# Patient Record
Sex: FEMALE | Race: ASIAN | NOT HISPANIC OR LATINO | URBAN - METROPOLITAN AREA
[De-identification: names, ages, dates, MRNs, and addresses within clinical notes are randomized per-mention and may not be internally consistent; named-entity substitution may affect disease eponyms.]

---

## 2022-09-22 ENCOUNTER — EMERGENCY (EMERGENCY)
Facility: HOSPITAL | Age: 22
LOS: 0 days | Discharge: HOME | End: 2022-09-23
Attending: EMERGENCY MEDICINE | Admitting: EMERGENCY MEDICINE

## 2022-09-22 VITALS
SYSTOLIC BLOOD PRESSURE: 107 MMHG | DIASTOLIC BLOOD PRESSURE: 75 MMHG | TEMPERATURE: 99 F | HEART RATE: 92 BPM | OXYGEN SATURATION: 100 %

## 2022-09-22 DIAGNOSIS — M25.522 PAIN IN LEFT ELBOW: ICD-10-CM
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
## 2022-09-22 DIAGNOSIS — F41.8 OTHER SPECIFIED ANXIETY DISORDERS: ICD-10-CM

## 2022-09-22 DIAGNOSIS — M79.602 PAIN IN LEFT ARM: ICD-10-CM
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
## 2022-09-22 DIAGNOSIS — M79.622 PAIN IN LEFT UPPER ARM: ICD-10-CM

## 2022-09-22 PROCEDURE — 73080 X-RAY EXAM OF ELBOW: CPT | Mod: 26,LT

## 2022-09-22 PROCEDURE — 73060 X-RAY EXAM OF HUMERUS: CPT | Mod: 26,LT

## 2022-09-22 PROCEDURE — 99284 EMERGENCY DEPT VISIT MOD MDM: CPT

## 2022-09-22 PROCEDURE — 73030 X-RAY EXAM OF SHOULDER: CPT | Mod: 26,LT

## 2022-09-22 RX ORDER — IBUPROFEN 200 MG
600 TABLET ORAL ONCE
Refills: 0 | Status: COMPLETED | OUTPATIENT
Start: 2022-09-22 | End: 2022-09-22

## 2022-09-22 NOTE — ED PROVIDER NOTE - OBJECTIVE STATEMENT
22 yold female to Ed Pmhx deperession/anxiety c/o left arm pain - pt accidentally fell directly on elbow c/o pain to upper arm and left elbow area; pt with limited rom due to pain; denies head trauma, neck, chest, back or abdominal pain;

## 2022-09-22 NOTE — ED PROVIDER NOTE - CARE PROVIDER_API CALL
Saurabh Ramos)  Orthopaedic Surgery  [address] [place] [address]  Phone: [phone]  Fax: [phone]  Follow Up Time: 4-6 Days

## 2022-09-22 NOTE — ED PROVIDER NOTE - PATIENT PORTAL LINK FT
You can access the FollowMyHealth Patient Portal offered by [facility] by registering at the following website: [email or web address]. By joining [employer]’s FollowMyHealth portal, you will also be able to view your health information using other applications (apps) compatible with our system.

## 2022-09-22 NOTE — ED PROVIDER NOTE - CLINICAL SUMMARY MEDICAL DECISION MAKING FREE TEXT BOX
22yoF with h/o anxiety, depression, p/w primarily L arm pain after slip and fall on oil shortly PTA. Denies head trauma and all other symptoms. On exam, afebrile, hemodynamically stable, saturating well, NAD, well appearing, sitting comfortably in chair, no WOB, speaking full sentences, head NCAT, EOMI grossly, anicteric, MMM, RRR, breathing comfortably on RA, AAO, CN's 3-12 grossly intact, no shoulder/elbow TTP/stepoff/deformity, full pron/supination, declines to range shoulder, no extrem cyanosis or edema, 2+ radial pulse, <2 sec cap refill, skin warm, well perfused, no rashes or hives. Exam and Xrays low suspicion for fx/dislocation. Neurovascularly intact extrem. Declined sling. Patient is well appearing, NAD, afebrile, hemodynamically stable. Any available tests and studies were discussed with patient. Discharged with instructions in further symptomatic care, return precautions, and need for ortho f/u.

## 2022-09-22 NOTE — ED PROVIDER NOTE - CARE PLAN
Principal Discharge DX:	Contusion   1 Principal Discharge DX:	Left arm pain  Secondary Diagnosis:	Fall, initial encounter

## 2022-09-22 NOTE — ED PROVIDER NOTE - PHYSICAL EXAMINATION
Vital Signs: I have reviewed the initial vital signs.  Constitutional: well-nourished, appears stated age, no acute distress  Heent: NC/AT; no facial tenderness; neck: flex/ext/rotation intact non tender; no midline tenderness;   Lungs; clear to auscultation; no rib tenderness;   Musculoskeletal: Left shoulder symmetrical; no ac drop off; no humeral head tenderness; + tenderness to mid/distal humeral area and elbow; limited flex/ext at elbow; pronation/supination at forearm intact; flex/ext intact at wrist; pulses and sensory fx intact;

## 2022-09-22 NOTE — ED PROVIDER NOTE - NS ED ROS FT
Constitutional: (-) fever  Cardiovascular: (-) syncope  Integumentary: (-) rash  Musculoskeltal:  left arm pain s/p fall  Neurological: (-) altered mental status

## 2022-09-23 VITALS — RESPIRATION RATE: 18 BRPM

## 2022-09-23 RX ADMIN — Medication 600 MILLIGRAM(S): at 00:07

## 2023-05-23 ENCOUNTER — INPATIENT (INPATIENT)
Facility: HOSPITAL | Age: 23
LOS: 0 days | Discharge: HOME | End: 2023-05-23
Attending: HOSPITALIST | Admitting: HOSPITALIST
Payer: MEDICAID

## 2023-05-23 VITALS
OXYGEN SATURATION: 98 % | SYSTOLIC BLOOD PRESSURE: 108 MMHG | HEART RATE: 80 BPM | RESPIRATION RATE: 18 BRPM | DIASTOLIC BLOOD PRESSURE: 61 MMHG | TEMPERATURE: 98 F

## 2023-05-23 VITALS
HEART RATE: 92 BPM | SYSTOLIC BLOOD PRESSURE: 115 MMHG | DIASTOLIC BLOOD PRESSURE: 77 MMHG | TEMPERATURE: 97 F | OXYGEN SATURATION: 99 % | RESPIRATION RATE: 20 BRPM

## 2023-05-23 DIAGNOSIS — L03.90 CELLULITIS, UNSPECIFIED: ICD-10-CM
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
## 2023-05-23 PROBLEM — F32.A DEPRESSION, UNSPECIFIED: Chronic | Status: ACTIVE | Noted: 2022-09-22

## 2023-05-23 LAB
ALBUMIN SERPL ELPH-MCNC: 4.6 G/DL — SIGNIFICANT CHANGE UP (ref 3.5–5.2)
ALP SERPL-CCNC: 84 U/L — SIGNIFICANT CHANGE UP (ref 30–115)
ALT FLD-CCNC: 19 U/L — SIGNIFICANT CHANGE UP (ref 0–41)
ANION GAP SERPL CALC-SCNC: 12 MMOL/L — SIGNIFICANT CHANGE UP (ref 7–14)
AST SERPL-CCNC: 21 U/L — SIGNIFICANT CHANGE UP (ref 0–41)
BASOPHILS # BLD AUTO: 0.01 K/UL — SIGNIFICANT CHANGE UP (ref 0–0.2)
BASOPHILS NFR BLD AUTO: 0.1 % — SIGNIFICANT CHANGE UP (ref 0–1)
BILIRUB SERPL-MCNC: 0.6 MG/DL — SIGNIFICANT CHANGE UP (ref 0.2–1.2)
BUN SERPL-MCNC: 10 MG/DL — SIGNIFICANT CHANGE UP (ref 10–20)
CALCIUM SERPL-MCNC: 9.3 MG/DL — SIGNIFICANT CHANGE UP (ref 8.4–10.5)
CHLORIDE SERPL-SCNC: 103 MMOL/L — SIGNIFICANT CHANGE UP (ref 98–110)
CO2 SERPL-SCNC: 22 MMOL/L — SIGNIFICANT CHANGE UP (ref 17–32)
CREAT SERPL-MCNC: 0.7 MG/DL — SIGNIFICANT CHANGE UP (ref 0.7–1.5)
EGFR: 126 ML/MIN/1.73M2 — SIGNIFICANT CHANGE UP
EOSINOPHIL # BLD AUTO: 0.37 K/UL — SIGNIFICANT CHANGE UP (ref 0–0.7)
EOSINOPHIL NFR BLD AUTO: 3.8 % — SIGNIFICANT CHANGE UP (ref 0–8)
GLUCOSE SERPL-MCNC: 94 MG/DL — SIGNIFICANT CHANGE UP (ref 70–99)
HCG SERPL QL: NEGATIVE — SIGNIFICANT CHANGE UP
HCT VFR BLD CALC: 36.7 % — LOW (ref 37–47)
HGB BLD-MCNC: 12.3 G/DL — SIGNIFICANT CHANGE UP (ref 12–16)
IMM GRANULOCYTES NFR BLD AUTO: 0.3 % — SIGNIFICANT CHANGE UP (ref 0.1–0.3)
LACTATE SERPL-SCNC: 1.1 MMOL/L — SIGNIFICANT CHANGE UP (ref 0.7–2)
LYMPHOCYTES # BLD AUTO: 2.34 K/UL — SIGNIFICANT CHANGE UP (ref 1.2–3.4)
LYMPHOCYTES # BLD AUTO: 24 % — SIGNIFICANT CHANGE UP (ref 20.5–51.1)
MCHC RBC-ENTMCNC: 31.1 PG — HIGH (ref 27–31)
MCHC RBC-ENTMCNC: 33.5 G/DL — SIGNIFICANT CHANGE UP (ref 32–37)
MCV RBC AUTO: 92.7 FL — SIGNIFICANT CHANGE UP (ref 81–99)
MONOCYTES # BLD AUTO: 0.82 K/UL — HIGH (ref 0.1–0.6)
MONOCYTES NFR BLD AUTO: 8.4 % — SIGNIFICANT CHANGE UP (ref 1.7–9.3)
NEUTROPHILS # BLD AUTO: 6.18 K/UL — SIGNIFICANT CHANGE UP (ref 1.4–6.5)
NEUTROPHILS NFR BLD AUTO: 63.4 % — SIGNIFICANT CHANGE UP (ref 42.2–75.2)
NRBC # BLD: 0 /100 WBCS — SIGNIFICANT CHANGE UP (ref 0–0)
PLATELET # BLD AUTO: 194 K/UL — SIGNIFICANT CHANGE UP (ref 130–400)
PMV BLD: 11.3 FL — HIGH (ref 7.4–10.4)
POTASSIUM SERPL-MCNC: 4 MMOL/L — SIGNIFICANT CHANGE UP (ref 3.5–5)
POTASSIUM SERPL-SCNC: 4 MMOL/L — SIGNIFICANT CHANGE UP (ref 3.5–5)
PROT SERPL-MCNC: 7.2 G/DL — SIGNIFICANT CHANGE UP (ref 6–8)
RBC # BLD: 3.96 M/UL — LOW (ref 4.2–5.4)
RBC # FLD: 13.3 % — SIGNIFICANT CHANGE UP (ref 11.5–14.5)
SODIUM SERPL-SCNC: 137 MMOL/L — SIGNIFICANT CHANGE UP (ref 135–146)
WBC # BLD: 9.75 K/UL — SIGNIFICANT CHANGE UP (ref 4.8–10.8)
WBC # FLD AUTO: 9.75 K/UL — SIGNIFICANT CHANGE UP (ref 4.8–10.8)

## 2023-05-23 PROCEDURE — 73130 X-RAY EXAM OF HAND: CPT | Mod: 26,RT

## 2023-05-23 PROCEDURE — 99285 EMERGENCY DEPT VISIT HI MDM: CPT

## 2023-05-23 PROCEDURE — 99238 HOSP IP/OBS DSCHRG MGMT 30/<: CPT

## 2023-05-23 PROCEDURE — G0378: CPT

## 2023-05-23 RX ORDER — SODIUM CHLORIDE 9 MG/ML
250 INJECTION, SOLUTION INTRAVENOUS
Refills: 0 | Status: DISCONTINUED | OUTPATIENT
Start: 2023-05-23 | End: 2023-05-23

## 2023-05-23 RX ORDER — CEFAZOLIN SODIUM 1 G
2000 VIAL (EA) INJECTION ONCE
Refills: 0 | Status: COMPLETED | OUTPATIENT
Start: 2023-05-23 | End: 2023-05-23

## 2023-05-23 RX ORDER — DEXAMETHASONE 0.5 MG/5ML
10 ELIXIR ORAL ONCE
Refills: 0 | Status: COMPLETED | OUTPATIENT
Start: 2023-05-23 | End: 2023-05-23

## 2023-05-23 RX ORDER — CEPHALEXIN 500 MG
1 CAPSULE ORAL
Qty: 28 | Refills: 0
Start: 2023-05-23 | End: 2023-05-29

## 2023-05-23 RX ORDER — DIPHENHYDRAMINE HCL 50 MG
50 CAPSULE ORAL ONCE
Refills: 0 | Status: COMPLETED | OUTPATIENT
Start: 2023-05-23 | End: 2023-05-23

## 2023-05-23 RX ADMIN — Medication 100 MILLIGRAM(S): at 06:49

## 2023-05-23 RX ADMIN — Medication 102 MILLIGRAM(S): at 07:49

## 2023-05-23 RX ADMIN — SODIUM CHLORIDE 500 MILLILITER(S): 9 INJECTION, SOLUTION INTRAVENOUS at 10:26

## 2023-05-23 RX ADMIN — Medication 50 MILLIGRAM(S): at 07:22

## 2023-05-23 NOTE — ED PROVIDER NOTE - PROGRESS NOTE DETAILS
SS Received signout from PA sure.  Patient evaluated at bedside complaining of mild itchiness after completion of antibiotics.  Denies shortness of breath or difficulty breathing.  No uvula swelling, lungs clear  to auscultation bilaterally.  Given benadryl and steroids. Plan to admit for IV antibiotics.  Patient aware and agreeable to plan. Signout received from Dr. Holden.  21 yo female w/ no PMH presents for rash to bilateral UE x 2 days after being in a [place].  No hx of outdoor activities.  Pt received Ancef while being in ED and was noted to have allergic reaction w/ hives and mild upper lip swelling after receiving full dose of Ancef.  No SOB, throat tightness, N/V, abdominal pain, diarrhea. Vitals reviewed. On exam. urticaria noticed on lower back.  Papular rash noted on extremities including palms.  R hand appears edematous and erythematous, with erythema extending to R forearm. LCTAB. Possible mild upper lip swelling. Oropharynx WNL, uvula nonedematous. Abdomen soft,nontender, nondistended.    The differential diagnosis for patients clinical presentation includes but is not limited to:  Cellulitis/lymphangitis, allergic reaction, [place] spotted disease, Varicella, viral exanthem, sporotrichosis, nec fasc, SJS/TENS, DRESS    Labs ordered and reviewed.  XR was ordered and reviewed by me.  Pt finished dose of ancef, after likely had allergic reaction to ancef, received benadryl and decadron, no signs of anaphylaxis noted. Escalation to admission/observation was considered. Patient requires inpatient hospitalization due to cellulitis with signs of lymphangitis. Jose: Signout received from Dr. Holden.  21 yo female w/ no PMH presents for rash to bilateral UE x 2 days after being in a [place].  No hx of outdoor activities.  Pt received Ancef while being in ED and was noted to have allergic reaction w/ hives and mild upper lip swelling after receiving full dose of Ancef.  No SOB, throat tightness, N/V, abdominal pain, diarrhea. Vitals reviewed. On exam. urticaria noticed on lower back.  Papular rash noted on extremities including palms.  R hand appears edematous and erythematous, with erythema extending to R forearm. LCTAB. Possible mild upper lip swelling. Oropharynx WNL, uvula nonedematous. Abdomen soft,nontender, nondistended.    The differential diagnosis for patients clinical presentation includes but is not limited to:  Cellulitis/lymphangitis, allergic reaction, Duong [place] spotted disease, Varicella, viral exanthem, sporotrichosis, nec fasc, SJS/TENS, DRESS    Labs ordered and reviewed.  XR was ordered and reviewed by me.  Pt finished dose of ancef, after likely had allergic reaction to ancef, received benadryl and decadron, no signs of anaphylaxis noted. Escalation to admission/observation was considered. Case discussed with MAR. Patient requires inpatient hospitalization due to cellulitis with signs of lymphangitis. Jose: Signout received from Dr. Holden.  23 yo female w/ no PMH presents for rash to bilateral UE x 2 days after being in [place].  No hx of outdoor activities.  Pt received Ancef while being in ED and was noted to have allergic reaction w/ hives and mild upper lip swelling after receiving full dose of Ancef.  No SOB, throat tightness, N/V, abdominal pain, diarrhea. Vitals reviewed. On exam. urticaria noticed on lower back.  Papular rash noted on extremities including palms.  R hand appears edematous and erythematous, with erythema extending to R forearm. LCTAB. Possible mild upper lip swelling. Oropharynx WNL, uvula nonedematous. Abdomen soft,nontender, nondistended.    The differential diagnosis for patients clinical presentation includes but is not limited to:  Cellulitis/lymphangitis, allergic reaction, Du[place] spotted disease, Varicella, viral exanthem, sporotrichosis, nec fasc, SJS/TENS, DRESS    Labs ordered and reviewed.  XR was ordered and reviewed by me.  Pt finished dose of ancef, after likely had allergic reaction to ancef, received benadryl and decadron, no signs of anaphylaxis noted. Escalation to admission/observation was considered. Case discussed with MAR. Patient requires inpatient hospitalization due to cellulitis with signs of lymphangitis.

## 2023-05-23 NOTE — H&P ADULT - HISTORY OF PRESENT ILLNESS
23 yo F with no significant PMHx presents to the ED c/o rash to B/L arms that started 2 days ago. Patient states  was staying in a hotel in [place] and noted to have what she believes are insect bites to her hands/arms. As well has noted similar lesions on lower extremities She has not taken any medication to improve her symptoms. She came back to [place] today so she presented here for evaluation. She denies hx of similar symptoms in the past. She denies other complaints. Pt denies fever, chills, nausea, vomiting, abdominal pain, diarrhea, headache, dizziness, weakness, chest pain, SOB, back pain, LOC, trauma, urinary symptoms, cough, calf pain/swelling, recent surgery.     In the ED vitals stable.   Labs unremarkable.   Was given cefazolin, benadryl, dexamethasone and benadryl.  21 yo F with no significant PMHx presents to the ED c/o rash to B/L arms that started 2 days ago. Patient states  was staying in a hotel in [place] and noted to have what she believes are insect bites to her hands/arms. As well has noted similar lesions on lower extremities She has not taken any medication to improve her symptoms. She came back to [place] today so she presented here for evaluation. She denies hx of similar symptoms in the past. She denies other complaints. Pt denies fever, chills, nausea, vomiting, abdominal pain, diarrhea, headache, dizziness, weakness, chest pain, SOB, back pain, LOC, trauma, urinary symptoms, cough, calf pain/swelling, recent surgery. States she believes it is getting better.     In the ED vitals stable.   Labs unremarkable.   Was given cefazolin in the ED and then had suspected allergic reaction. Was then given dexamethasone and benadryl.

## 2023-05-23 NOTE — ED PROVIDER NOTE - ATTENDING CONTRIBUTION TO CARE
22-year-old female no PMH presenting with rash to bilateral arms x2 to 3 days.  Patient has been visiting [place] since the beginning of the month, staying in a hotel, on 5/8 noticed several bug bites to left upper arm, followed by bug bites to bilateral hands and right ankle.  Over the last few days has developed bilateral hand erythema/edema, right worse than left, accompanied by red streaking/lymphangitis extending up to antecubital fossa bilaterally.  No F/C, pruritus, N/V, abdominal pain.  Came back to [place] today, came to ED for further evaluation.    PE:  nad  skin- ?insect bites to L upper arm, bl hands, L abd, R ankle, with erythema/edema of bl hands, R>L, and erythematous steaking/lymphangitis of bl arms extending up to antecubital fossa bl  ncat  neck supple  rrr nl s1s2 no mrg  ctab no wrr  abd soft ntnd no palpable masses no rgr  back non-tender no cvat  ext no cce dpi  neuro aaox3 grossly nf exam 22-year-old female no PMH presenting with rash to bilateral arms x2 to 3 days.  Patient has been visiting [place] since the beginning of the month, staying in a hotel, on 5/8 noticed several bug bites to left upper arm, followed by bug bites to bilateral hands and right ankle.  Over the last few days has developed bilateral hand erythema/edema, right worse than left, accompanied by red streaking/lymphangitis extending up to antecubital fossa bilaterally.  No F/C, pruritus, N/V, abdominal pain.  Came back to [place] today, came to ED for further evaluation.    PE:  nad  skin- ?insect bites to L upper arm, bl hands, L abd, R ankle, with erythema/edema of bl hands, R>L, and erythematous steaking/lymphangitis of bl arms extending up to antecubital fossa bl  ncat  neck supple  rrr nl s1s2 no mrg  ctab no wrr  abd soft ntnd no palpable masses no rgr  back non-tender no cvat  ext no cce dpi  neuro aaox3 grossly nf exam

## 2023-05-23 NOTE — ED PROVIDER NOTE - NS ED ATTENDING STATEMENT MOD
I have seen and examined this patient and fully participated in the care of this patient as the teaching attending.  The service was shared with the JEFF.  I reviewed and verified the documentation and independently performed the documented:

## 2023-05-23 NOTE — H&P ADULT - ATTENDING COMMENTS
21 yo F with no significant PMHx presents to the ED c/o rash to B/L arms that started 2 days ago.    #Nonpurulent RUE cellulitis   - discharge on Keflex   - patient instructed to return to ED if symptoms do not improve

## 2023-05-23 NOTE — DISCHARGE NOTE NURSING/CASE MANAGEMENT/SOCIAL WORK - PATIENT PORTAL LINK FT
You can access the FollowMyHealth Patient Portal offered by [facility] by registering at the following website: [email or web address]. By joining [employer]’s FollowMyHealth portal, you will also be able to view your health information using other applications (apps) compatible with our system.

## 2023-05-23 NOTE — H&P ADULT - NSHPREVIEWOFSYSTEMS_GEN_ALL_CORE
REVIEW OF SYSTEMS:    CONSTITUTIONAL: No fever, weight loss, or fatigue  EYES: No eye pain, visual disturbances, or discharge  ENMT:  No difficulty hearing, tinnitus, vertigo; No sinus or throat pain  NECK: No pain or stiffness  BREASTS: No pain, masses, or nipple discharge  RESPIRATORY: No cough, wheezing, chills or hemoptysis; No shortness of breath  CARDIOVASCULAR: No chest pain, palpitations, dizziness, or leg swelling  GASTROINTESTINAL: No abdominal or epigastric pain. No nausea, vomiting, or hematemesis; No diarrhea or constipation. No melena or hematochezia.  GENITOURINARY: No dysuria, frequency, hematuria, or incontinence  NEUROLOGICAL: No headaches, memory loss, loss of strength, numbness, or tremors  SKIN: see hpi   LYMPH NODES: No enlarged glands  ENDOCRINE: No heat or cold intolerance; No hair loss  MUSCULOSKELETAL: No joint pain or swelling; No muscle, back, or extremity pain  PSYCHIATRIC: No depression, anxiety, mood swings, or difficulty sleeping  HEME/LYMPH: No easy bruising, or bleeding gums  ALLERGY AND IMMUNOLOGIC: No hives or eczema

## 2023-05-23 NOTE — ED PROVIDER NOTE - CONSIDERATION OF ADMISSION OBSERVATION
Consideration of Admission/Observation pt admitted for possible cellulitis/lymphangitis noted in RUE

## 2023-05-23 NOTE — ED PROVIDER NOTE - CLINICAL SUMMARY MEDICAL DECISION MAKING FREE TEXT BOX
s/o form Dr. Holden,  pt with allergic reaction to cefazolin  labs reviewed, will add steroids, and admit.

## 2023-05-23 NOTE — DISCHARGE NOTE PROVIDER - NSDCCPCAREPLAN_GEN_ALL_CORE_FT
PRINCIPAL DISCHARGE DIAGNOSIS  Diagnosis: Bed bug bite  Assessment and Plan of Treatment: You likely got bed bugs. You have many lesions consistent with insect bite. NO medication is needed for treatment. it will resolve on its own. you can use topical emolients and anti-itch topical lotion for symptomatic relief. If the lesions persists you can follow up with dermatology.      SECONDARY DISCHARGE DIAGNOSES  Diagnosis: Cellulitis with lymphangitis  Assessment and Plan of Treatment:      PRINCIPAL DISCHARGE DIAGNOSIS  Diagnosis: Bed bug bite  Assessment and Plan of Treatment: You likely got bed bugs. You have many lesions consistent with insect bite. No meds needed for this, it should resolve on its own. You were noted to have overlying cellulitis of the hand, this is a skin infection. We will start you on antibiotics for 7 days, please take as instructed. you can use topical emolients and anti-itch topical lotion for symptomatic relief. If the lesions persists you can follow up with dermatology, pcp or return to the ED.      SECONDARY DISCHARGE DIAGNOSES  Diagnosis: Cellulitis with lymphangitis  Assessment and Plan of Treatment:

## 2023-05-23 NOTE — ED PROVIDER NOTE - OBJECTIVE STATEMENT
21 yo F with no significant PMHx presents to the ED c/o rash to B/L arms that started 2 days ago and has been worsening. Pt was staying in a hotel in [place] and noted to have some insect bites to her hands/arms on 5/8. She has not taken any medication to improve her symptoms. She came back to [place] today so she presented here for evaluation. She denies hx of similar symptoms in the past. She denies other complaints. Pt denies fever, chills, nausea, vomiting, abdominal pain, diarrhea, headache, dizziness, weakness, chest pain, SOB, back pain, LOC, trauma, urinary symptoms, cough, calf pain/swelling, recent surgery.

## 2023-05-23 NOTE — DISCHARGE NOTE PROVIDER - CARE PROVIDER_API CALL
Nahum Benjamin)  Dermatology; Internal Medicine  [address] [place] [id number]  Phone: [phone]  Fax: [phone]  Follow Up Time: 2 weeks

## 2023-05-23 NOTE — H&P ADULT - NSHPLABSRESULTS_GEN_ALL_CORE
LABS/RADIOLOGY RESULTS:                          12.3   9.75  )-----------( 194      ( 23 May 2023 06:40 )             36.7   05-23    137  |  103  |  10  ----------------------------<  94  4.0   |  22  |  0.7    Ca    9.3      23 May 2023 06:40    TPro  7.2  /  Alb  4.6  /  TBili  0.6  /  DBili  x   /  AST  21  /  ALT  19  /  AlkPhos  84  05-23  Blood Cultures

## 2023-05-23 NOTE — ED ADULT NURSE NOTE - NSFALLUNIVINTERV_ED_ALL_ED
Bed/Stretcher in lowest position, wheels locked, appropriate side rails in place/Call bell, personal items and telephone in reach/Instruct patient to call for assistance before getting out of bed/chair/stretcher/Non-slip footwear applied when patient is off stretcher/[place] to call system/Physically safe environment - no spills, clutter or unnecessary equipment/Purposeful proactive rounding/Room/bathroom lighting operational, light cord in reach

## 2023-05-23 NOTE — ED PROVIDER NOTE - DIFFERENTIAL DIAGNOSIS
Cellulitis/lymphangitis, allergic reaction, Duong Mountain spotted disease, Varicella, viral exanthem, sporotrichosis, nec fasc, SJS/TENS, DRESS Differential Diagnosis

## 2023-05-23 NOTE — H&P ADULT - NSHPPHYSICALEXAM_GEN_ALL_CORE
VITALS:   T(C): 36.8 (05-23-23 @ 09:47), Max: 36.8 (05-23-23 @ 09:47)  HR: 89 (05-23-23 @ 09:47) (89 - 92)  BP: 89/52 (05-23-23 @ 09:47) (89/52 - 115/77)  RR: 18 (05-23-23 @ 09:47) (18 - 20)  SpO2: 98% (05-23-23 @ 09:47) (98% - 99%)    GENERAL: NAD, lying in bed comfortably  HEAD:  Atraumatic, normocephalic  EYES: EOMI, PERRLA, conjunctiva and sclera clear  ENT: Moist mucous membranes  NECK: Supple, no JVD  HEART: Regular rate and rhythm, no murmurs, rubs, or gallops  LUNGS: Unlabored respirations.  Clear to auscultation bilaterally, no crackles, wheezing, or rhonchi  ABDOMEN: Soft, nontender, nondistended, +BS  EXTREMITIES: 2+ peripheral pulses bilaterally. No clubbing, cyanosis, or edema  NERVOUS SYSTEM:  A&Ox3, no focal deficits   SKIN: MOttled skin on RUE, multiple subcentimeter erythemic lesion on upper and lower extremities VITALS:   T(C): 36.8 (05-23-23 @ 09:47), Max: 36.8 (05-23-23 @ 09:47)  HR: 89 (05-23-23 @ 09:47) (89 - 92)  BP: 89/52 (05-23-23 @ 09:47) (89/52 - 115/77)  RR: 18 (05-23-23 @ 09:47) (18 - 20)  SpO2: 98% (05-23-23 @ 09:47) (98% - 99%)    GENERAL: NAD, lying in bed comfortably  HEAD:  Atraumatic, normocephalic  EYES: EOMI, PERRLA, conjunctiva and sclera clear  ENT: Moist mucous membranes  NECK: Supple, no JVD  HEART: Regular rate and rhythm, no murmurs, rubs, or gallops  LUNGS: Unlabored respirations.  Clear to auscultation bilaterally, no crackles, wheezing, or rhonchi  ABDOMEN: Soft, nontender, nondistended, +BS  EXTREMITIES: 2+ peripheral pulses bilaterally. No clubbing, cyanosis, or edema  NERVOUS SYSTEM:  A&Ox3, no focal deficits   SKIN: MOttled skin on RUE, erthema and mild swelling of R hand, multiple subcentimeter erythremic lesion on upper and lower extremities

## 2023-05-23 NOTE — DISCHARGE NOTE PROVIDER - HOSPITAL COURSE
21 yo F with no significant PMHx presents to the ED c/o rash to B/L arms that started 2 days ago. Patient states  was staying in a hotel in [place] and noted to have what she believes are insect bites to her hands/arms. As well has noted similar lesions on lower extremities She has not taken any medication to improve her symptoms. She came back to [place] today so she presented here for evaluation. She denies hx of similar symptoms in the past. She denies other complaints. Pt denies fever, chills, nausea, vomiting, abdominal pain, diarrhea, headache, dizziness, weakness, chest pain, SOB, back pain, LOC, trauma, urinary symptoms, cough, calf pain/swelling, recent surgery.     Was given cefalozin in the ED, subsequently had allergic reaction and was given steroids and benadryl.   Vitals stable.   labs unremarkable    If no improvement in skin lesion can follow up with dermatology outpatient.   Patient is medically stable and ready for discharge. 21 yo F with no significant PMHx presents to the ED c/o rash to B/L arms that started 2 days ago. Patient states  was staying in a hotel in [place] and noted to have what she believes are insect bites to her hands/arms. As well has noted similar lesions on lower extremities She has not taken any medication to improve her symptoms. She came back to [place] today so she presented here for evaluation. She denies hx of similar symptoms in the past. She denies other complaints. Pt denies fever, chills, nausea, vomiting, abdominal pain, diarrhea, headache, dizziness, weakness, chest pain, SOB, back pain, LOC, trauma, urinary symptoms, cough, calf pain/swelling, recent surgery.     Was given cefalozin in the ED, subsequently had allergic reaction and was given steroids and benadryl.   Vitals stable.   labs unremarkable    NOted multiple subcentimeter papular skin lesions on extremities consistent with insect bites. Erythema and swelling of RUE extremity consistent with cellulitis. Will start course of cephalexin and patient can be DCed. If worsening instructed to return to ED or see PCP.       Patient is medically stable and ready for discharge.

## 2023-05-23 NOTE — ED PROVIDER NOTE - PHYSICAL EXAMINATION
VITAL SIGNS: I have reviewed nursing notes and confirm.  CONSTITUTIONAL: 21 yo F sitting on chair; in no acute distress.  SKIN: Skin exam is warm and dry.  HEAD: Normocephalic; atraumatic.  EYES: Conjunctiva and sclera clear.  ENT: No nasal discharge; airway clear.   CARD: S1, S2 normal; no murmurs, gallops, or rubs. Regular rate and rhythm.  RESP: No wheezes, rales or rhonchi. Speaking in full sentences.   ABD: Normal bowel sounds; soft; non-distended; non-tender; No rebound or guarding. No CVA tenderness. (+) small area of erythema to LLQ. Non-tender.   EXT: Normal ROM. (+) scattered insect sites? to B/L arms R>L with erythema/swelling of R hand with streaking/lymphangitis up above AC fossa. (+) erythema of L hand with streaking/lymphangitis up above AC fossa. Radial pulses 2+ B/L and equal.    NEURO: Alert, oriented. Grossly unremarkable. No focal deficits.
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
## 2023-05-26 DIAGNOSIS — L03.113 CELLULITIS OF RIGHT UPPER LIMB: ICD-10-CM

## 2023-05-26 DIAGNOSIS — S40.862A INSECT BITE (NONVENOMOUS) OF LEFT UPPER ARM, INITIAL ENCOUNTER: ICD-10-CM

## 2023-05-26 DIAGNOSIS — S40.861A INSECT BITE (NONVENOMOUS) OF RIGHT UPPER ARM, INITIAL ENCOUNTER: ICD-10-CM

## 2023-05-28 LAB
CULTURE RESULTS: SIGNIFICANT CHANGE UP
CULTURE RESULTS: SIGNIFICANT CHANGE UP
SPECIMEN SOURCE: SIGNIFICANT CHANGE UP
SPECIMEN SOURCE: SIGNIFICANT CHANGE UP

## 2024-09-16 NOTE — ED ADULT NURSE NOTE - CAS ELECT INFOMATION PROVIDED
Addended by: RADHA RODRIGES on: 9/16/2024 10:13 AM     Modules accepted: Level of Service     DC instructions